# Patient Record
Sex: MALE | Race: WHITE
[De-identification: names, ages, dates, MRNs, and addresses within clinical notes are randomized per-mention and may not be internally consistent; named-entity substitution may affect disease eponyms.]

---

## 2017-11-29 ENCOUNTER — TRANSCRIPTION ENCOUNTER (OUTPATIENT)
Age: 57
End: 2017-11-29

## 2019-03-12 PROBLEM — Z00.00 ENCOUNTER FOR PREVENTIVE HEALTH EXAMINATION: Status: ACTIVE | Noted: 2019-03-12

## 2019-03-26 ENCOUNTER — APPOINTMENT (OUTPATIENT)
Dept: CARDIOLOGY | Facility: CLINIC | Age: 59
End: 2019-03-26
Payer: COMMERCIAL

## 2019-03-26 ENCOUNTER — NON-APPOINTMENT (OUTPATIENT)
Age: 59
End: 2019-03-26

## 2019-03-26 VITALS
DIASTOLIC BLOOD PRESSURE: 90 MMHG | HEART RATE: 84 BPM | HEIGHT: 72 IN | BODY MASS INDEX: 42.66 KG/M2 | SYSTOLIC BLOOD PRESSURE: 162 MMHG | WEIGHT: 315 LBS

## 2019-03-26 DIAGNOSIS — Z84.89 FAMILY HISTORY OF OTHER SPECIFIED CONDITIONS: ICD-10-CM

## 2019-03-26 DIAGNOSIS — K21.9 GASTRO-ESOPHAGEAL REFLUX DISEASE W/OUT ESOPHAGITIS: ICD-10-CM

## 2019-03-26 DIAGNOSIS — G47.33 OBSTRUCTIVE SLEEP APNEA (ADULT) (PEDIATRIC): ICD-10-CM

## 2019-03-26 DIAGNOSIS — R06.09 OTHER FORMS OF DYSPNEA: ICD-10-CM

## 2019-03-26 DIAGNOSIS — E78.5 HYPERLIPIDEMIA, UNSPECIFIED: ICD-10-CM

## 2019-03-26 DIAGNOSIS — Z87.891 PERSONAL HISTORY OF NICOTINE DEPENDENCE: ICD-10-CM

## 2019-03-26 DIAGNOSIS — E66.01 MORBID (SEVERE) OBESITY DUE TO EXCESS CALORIES: ICD-10-CM

## 2019-03-26 DIAGNOSIS — Z82.49 FAMILY HISTORY OF ISCHEMIC HEART DISEASE AND OTHER DISEASES OF THE CIRCULATORY SYSTEM: ICD-10-CM

## 2019-03-26 PROCEDURE — 93000 ELECTROCARDIOGRAM COMPLETE: CPT

## 2019-03-26 PROCEDURE — 99243 OFF/OP CNSLTJ NEW/EST LOW 30: CPT

## 2019-03-26 RX ORDER — LISINOPRIL AND HYDROCHLOROTHIAZIDE TABLETS 20; 12.5 MG/1; MG/1
20-12.5 TABLET ORAL DAILY
Refills: 0 | Status: ACTIVE | COMMUNITY

## 2019-03-26 RX ORDER — MULTIVITAMIN
TABLET ORAL
Refills: 0 | Status: ACTIVE | COMMUNITY

## 2019-03-26 RX ORDER — ASPIRIN 325 MG/1
325 TABLET, FILM COATED ORAL
Refills: 0 | Status: DISCONTINUED | COMMUNITY
End: 2019-03-26

## 2019-03-26 NOTE — PHYSICAL EXAM
[General Appearance - Well Developed] : well developed [Normal Appearance] : normal appearance [Well Groomed] : well groomed [General Appearance - Well Nourished] : well nourished [No Deformities] : no deformities [General Appearance - In No Acute Distress] : no acute distress [Normal Conjunctiva] : the conjunctiva exhibited no abnormalities [Eyelids - No Xanthelasma] : the eyelids demonstrated no xanthelasmas [No Oral Pallor] : no oral pallor [No Oral Cyanosis] : no oral cyanosis [Heart Rate And Rhythm] : heart rate and rhythm were normal [Heart Sounds] : normal S1 and S2 [Respiration, Rhythm And Depth] : normal respiratory rhythm and effort [Exaggerated Use Of Accessory Muscles For Inspiration] : no accessory muscle use [Auscultation Breath Sounds / Voice Sounds] : lungs were clear to auscultation bilaterally [Abdomen Soft] : soft [Abdomen Tenderness] : non-tender [Abdomen Mass (___ Cm)] : no abdominal mass palpated [Abnormal Walk] : normal gait [Nail Clubbing] : no clubbing of the fingernails [Cyanosis, Localized] : no localized cyanosis [Petechial Hemorrhages (___cm)] : no petechial hemorrhages [Skin Color & Pigmentation] : normal skin color and pigmentation [] : no rash [No Venous Stasis] : no venous stasis [Skin Lesions] : no skin lesions [No Skin Ulcers] : no skin ulcer [No Xanthoma] : no  xanthoma was observed [Oriented To Time, Place, And Person] : oriented to person, place, and time [Affect] : the affect was normal [Mood] : the mood was normal [FreeTextEntry1] : distant HS, trace nonpitting BL calf edema

## 2019-03-26 NOTE — HISTORY OF PRESENT ILLNESS
[FreeTextEntry1] : 57 y/o male presents today in cardiac consultation, referred by PCP Dr. Barrera, for chest pain.\par \par PMH of HTN, HLD, obesity, TAWANA (pending delivery of CPAP), former smoker (quit 4 months ago), FHx CAD, prediabetes, and GERD.\par There is no prior history of myocardial infarction, coronary revascularization, history of ischemic heart disease, or symptomatic congestive heart failure. There is no history of symptomatic arrhythmias including atrial fibrillation. There is no history of cerebrovascular events. \par \par He is active at work as a , walks daily, and has noticed over the past few weeks he has developed worsening dyspnea on exertion, after 100ft of ambulation. These symptoms resolve with rest within 1-2 minutes. Denies chest/jaw/arm pain associated with this activity. However, while at rest watching TV he notes his heart racing. No known aggravating or alleviating factors. On smart watch, HR from 70s to 110s and within a few seconds there is a knot in his chest. Then a few seconds later the sensation dissipates on its own. Denies dizziness or syncope. Denies orthopnea, edema, or weight gain. \par \par EKG today 3/26/19 ordered and interpreted by me reveals normal sinus rhythm with diffuse TWI's.\par \par BP on my exam today 168/100\par \par Past testing for reference: \par - Labs July 2018. K 4.2, Creat 0.96, HDL 44, LDL 92, trig 115, TSH 2.38, A1c 5.8, +RMSF IgG (-IgM)\par - Nuclear stress test 2011. No ischemia by EKG. 9.2 METS with hypertensive response, normal LVEF, normal myocardial perfusion

## 2019-03-26 NOTE — REASON FOR VISIT
[Consultation] : a consultation regarding [Chest Pain] : chest pain [Dyspnea] : dyspnea [Hypertension] : hypertension [Spouse] : spouse

## 2019-03-26 NOTE — ASSESSMENT
[FreeTextEntry1] : AMALIA BEAVER is a 58 year old M who presents today Mar 26, 2019 with the above history and the following active issues: \par \par - Atypical chest pain. Dyspnea on exertion. Abnormal EKG. \par Dyspnea after 100ft of ambulation without chest pain, resolves within minutes at rest. Possible anginal equivalent. Diffuse TWI's noted on EKG, no comparison available. He is asymptomatic at time of visit today.  Obtain 2d echocardiogram to evaluate resting heart structure, valvular function, and LVEF. Obtain a nuclear stress test to evaluate for evidence of myocardial ischemia. EKG will be nondiagnostic r/t abnormal baseline. Advised to seek emergent medical evaluation through ED or call 911 for any chest pain lasting >10 mins. He will take baby asa daily.\par \par - Heart racing. Symptoms present at rest and are associated with a knot in his chest, resolve within seconds on their own. No syncope. Obtain 24h holter monitor to determine presence of arrhythmias. He will increase his dose of beta blocker. \par \par - HTN. 168/100 on my exam today. No increase caffeine, salt, or alcohol intake. Compliant with lisinopril/hctz in am and toprol in pm. Increase toprol to 75mg daily. Reinforced low salt diet. \par \par - HLD. Reviewed labs. Well controlled LDL. Lifestyle modification measures advised. Would likely benefit long term from statin therapy. Discuss in followup.\par \par - TAWANA. Obesity. Weight loss reinforced. Emphasized importance of use of CPAP once he receives it. \par \par - Former smoker. Congratulation on smoking cessation. \par \par - Prediabetes. Consistent carbohydrate diet.\par \par - Multiple atherosclerotic risk factors. Obtain carotid ultrasound to evaluate for evidence of significant carotid atherosclerosis or obstruction. Obtain abdominal ultrasound to evaluate for aortic aneurysm or significant aortic atherosclerosis. Daily antiplatelet therapy. \par \par F/U after testing to review results.\par Discussed red flag symptoms, which would warrant sooner or emergent medical evaluation.\par Any questions and concerns were addressed and resolved. \par \par Sincerely,\par \par EDUARDO Bean\par Patients history, testing, and plan reviewed with supervising MD: Dr. Patrick Valentino

## 2019-03-26 NOTE — REVIEW OF SYSTEMS
[Feeling Fatigued] : feeling fatigued [see HPI] : see HPI [Dyspnea on exertion] : dyspnea during exertion [Chest Pain] : chest pain [Palpitations] : palpitations [Cough] : cough [Anxiety] : anxiety [Under Stress] : under stress [Negative] : Heme/Lymph [Fever] : no fever [Chills] : no chills [Chest  Pressure] : no chest pressure [Lower Ext Edema] : no extremity edema [Wheezing] : no wheezing [Coughing Up Blood] : no hemoptysis

## 2019-03-28 PROCEDURE — 93224 XTRNL ECG REC UP TO 48 HRS: CPT

## 2019-04-12 ENCOUNTER — APPOINTMENT (OUTPATIENT)
Dept: CARDIOLOGY | Facility: CLINIC | Age: 59
End: 2019-04-12
Payer: COMMERCIAL

## 2019-04-12 PROCEDURE — 93880 EXTRACRANIAL BILAT STUDY: CPT

## 2019-04-12 PROCEDURE — 93306 TTE W/DOPPLER COMPLETE: CPT

## 2019-04-12 PROCEDURE — 93979 VASCULAR STUDY: CPT

## 2019-04-15 ENCOUNTER — APPOINTMENT (OUTPATIENT)
Dept: CARDIOLOGY | Facility: CLINIC | Age: 59
End: 2019-04-15
Payer: COMMERCIAL

## 2019-04-15 PROCEDURE — A9502: CPT

## 2019-04-15 PROCEDURE — 78452 HT MUSCLE IMAGE SPECT MULT: CPT

## 2019-04-15 PROCEDURE — 93015 CV STRESS TEST SUPVJ I&R: CPT

## 2019-04-24 ENCOUNTER — APPOINTMENT (OUTPATIENT)
Dept: CARDIOLOGY | Facility: CLINIC | Age: 59
End: 2019-04-24
Payer: COMMERCIAL

## 2019-04-24 VITALS
DIASTOLIC BLOOD PRESSURE: 80 MMHG | OXYGEN SATURATION: 95 % | BODY MASS INDEX: 42.66 KG/M2 | SYSTOLIC BLOOD PRESSURE: 140 MMHG | HEART RATE: 86 BPM | WEIGHT: 315 LBS | HEIGHT: 72 IN

## 2019-04-24 DIAGNOSIS — R07.89 OTHER CHEST PAIN: ICD-10-CM

## 2019-04-24 DIAGNOSIS — I10 ESSENTIAL (PRIMARY) HYPERTENSION: ICD-10-CM

## 2019-04-24 DIAGNOSIS — R00.0 TACHYCARDIA, UNSPECIFIED: ICD-10-CM

## 2019-04-24 PROCEDURE — 99214 OFFICE O/P EST MOD 30 MIN: CPT

## 2019-04-24 NOTE — PHYSICAL EXAM
[General Appearance - Well Developed] : well developed [Normal Appearance] : normal appearance [Well Groomed] : well groomed [General Appearance - Well Nourished] : well nourished [No Deformities] : no deformities [General Appearance - In No Acute Distress] : no acute distress [Normal Conjunctiva] : the conjunctiva exhibited no abnormalities [Eyelids - No Xanthelasma] : the eyelids demonstrated no xanthelasmas [Normal Oral Mucosa] : normal oral mucosa [No Oral Pallor] : no oral pallor [No Oral Cyanosis] : no oral cyanosis [Normal Jugular Venous A Waves Present] : normal jugular venous A waves present [Normal Jugular Venous V Waves Present] : normal jugular venous V waves present [No Jugular Venous Burch A Waves] : no jugular venous burch A waves [] : no respiratory distress [Respiration, Rhythm And Depth] : normal respiratory rhythm and effort [Exaggerated Use Of Accessory Muscles For Inspiration] : no accessory muscle use [Auscultation Breath Sounds / Voice Sounds] : lungs were clear to auscultation bilaterally [Heart Rate And Rhythm] : heart rate and rhythm were normal [Heart Sounds] : normal S1 and S2 [Murmurs] : no murmurs present

## 2019-04-24 NOTE — HISTORY OF PRESENT ILLNESS
[FreeTextEntry1] : CP:  continues to have on off cp, knot like sensation, lasts minutes at a time, not always assoc w palps.  Last cig today.\par \par Risks/Benefits of invasive cor angio reviewed.  Pt. declines.\par \par Pt. agreeable to CTA cor (ordered).\par \par HTN:  cont present meds\par \par Palps:  holter w palps during nsr, cont observation.\par \par BMP pre stress test.  Unknown allg to PCN many years ago.

## 2019-05-09 ENCOUNTER — APPOINTMENT (OUTPATIENT)
Dept: CARDIOLOGY | Facility: CLINIC | Age: 59
End: 2019-05-09

## 2020-03-23 RX ORDER — METOPROLOL SUCCINATE 50 MG/1
50 TABLET, EXTENDED RELEASE ORAL
Qty: 135 | Refills: 0 | Status: ACTIVE | COMMUNITY
Start: 1900-01-01 | End: 1900-01-01